# Patient Record
Sex: FEMALE | Race: WHITE | Employment: FULL TIME | URBAN - METROPOLITAN AREA
[De-identification: names, ages, dates, MRNs, and addresses within clinical notes are randomized per-mention and may not be internally consistent; named-entity substitution may affect disease eponyms.]

---

## 2017-07-21 ENCOUNTER — HOSPITAL ENCOUNTER (EMERGENCY)
Age: 32
Discharge: HOME | End: 2017-07-21
Attending: EMERGENCY MEDICINE
Payer: SELF-PAY

## 2017-07-21 VITALS
WEIGHT: 223 LBS | DIASTOLIC BLOOD PRESSURE: 106 MMHG | HEIGHT: 69 IN | HEART RATE: 90 BPM | RESPIRATION RATE: 16 BRPM | BODY MASS INDEX: 33.03 KG/M2 | TEMPERATURE: 98.6 F | OXYGEN SATURATION: 100 % | SYSTOLIC BLOOD PRESSURE: 151 MMHG

## 2017-07-21 DIAGNOSIS — M54.2 NECK PAIN: Primary | ICD-10-CM

## 2017-07-21 PROCEDURE — 99282 EMERGENCY DEPT VISIT SF MDM: CPT | Performed by: NURSE PRACTITIONER

## 2017-07-21 RX ORDER — METHOCARBAMOL 750 MG/1
750 TABLET, FILM COATED ORAL
Qty: 15 TAB | Refills: 0 | Status: SHIPPED | OUTPATIENT
Start: 2017-07-21
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
## 2022-08-20 ENCOUNTER — HOSPITAL ENCOUNTER (EMERGENCY)
Age: 37
Discharge: HOME | End: 2022-08-20
Attending: STUDENT IN AN ORGANIZED HEALTH CARE EDUCATION/TRAINING PROGRAM
Payer: COMMERCIAL

## 2022-08-20 ENCOUNTER — HOSPITAL ENCOUNTER (EMERGENCY)
Dept: ULTRASOUND IMAGING | Age: 37
Discharge: HOME | End: 2022-08-23
Payer: COMMERCIAL

## 2022-08-20 VITALS
DIASTOLIC BLOOD PRESSURE: 73 MMHG | RESPIRATION RATE: 16 BRPM | BODY MASS INDEX: 34.36 KG/M2 | HEART RATE: 95 BPM | SYSTOLIC BLOOD PRESSURE: 121 MMHG | OXYGEN SATURATION: 100 % | WEIGHT: 240 LBS | TEMPERATURE: 98.6 F | HEIGHT: 70 IN

## 2022-08-20 DIAGNOSIS — M79.605 PAIN IN BOTH LOWER EXTREMITIES: ICD-10-CM

## 2022-08-20 DIAGNOSIS — M79.89 LEG SWELLING: Primary | ICD-10-CM

## 2022-08-20 DIAGNOSIS — M79.604 PAIN IN BOTH LOWER EXTREMITIES: ICD-10-CM

## 2022-08-20 PROCEDURE — 99282 EMERGENCY DEPT VISIT SF MDM: CPT

## 2022-08-20 PROCEDURE — 93970 EXTREMITY STUDY: CPT

## 2022-08-20 ASSESSMENT — PAIN - FUNCTIONAL ASSESSMENT: PAIN_FUNCTIONAL_ASSESSMENT: 0-10

## 2022-08-20 ASSESSMENT — PAIN SCALES - GENERAL: PAINLEVEL_OUTOF10: 6

## 2022-08-20 ASSESSMENT — PAIN DESCRIPTION - DESCRIPTORS: DESCRIPTORS: ACHING

## 2022-08-20 ASSESSMENT — PAIN DESCRIPTION - LOCATION: LOCATION: LEG

## 2022-08-20 ASSESSMENT — PAIN DESCRIPTION - FREQUENCY: FREQUENCY: INTERMITTENT

## 2022-08-20 ASSESSMENT — PAIN DESCRIPTION - ORIENTATION: ORIENTATION: LEFT;RIGHT

## 2022-08-20 NOTE — DISCHARGE INSTRUCTIONS
Wear a light weight support stockings while at work, increase water intake, call office to arrange follow up appt, return to er if symptoms worsen    We would love to help you get a primary care doctor for follow-up after your emergency department visit. Please call [phone] between 7AM - 6PM Monday to Friday. A care navigator will be able to assist you with setting up a doctor close to your home.

## 2022-08-20 NOTE — ED PROVIDER NOTES
[facility] Emergency Department Provider Note                   PCP:                No primary care provider on file. Age: 40 y.o. Sex: female       ICD-10-CM    1. Leg swelling  M79.89 Vascular duplex lower extremity venous bilateral     Vascular duplex lower extremity venous bilateral      2. Pain in both lower extremities  M79.604     M79.605           DISPOSITION Decision To Discharge 08/20/2022 05:50:25 PM       New Prescriptions    No medications on file       Orders Placed This Encounter   Procedures    Vascular duplex lower extremity venous bilateral         Jus Chappell is a 40 y.o. female who presents to the Emergency Department with chief complaint of    Chief Complaint   Patient presents with    Leg Swelling      Patient to ER complaining of bilateral leg tightness off and on for the past month. She states she works on her feet 12-hour days and after few hours her legs start to feel tight. She denies any chest pain or shortness of breath. No trauma. No obvious edema to the legs. She has not taken any medication to help her symptoms is any change in her weight. History of diabetes, rheumatoid or any autoimmune disease, patient denies any pertinent family or surgical history        Review of Systems   All other systems reviewed and are negative. All other systems reviewed and are negative. No past medical history on file. No past surgical history on file. No family history on file. Social Connections: Not on file        No Known Allergies     Vitals signs and nursing note reviewed. Patient Vitals for the past 4 hrs:   Temp Pulse Resp BP SpO2   08/20/22 1556 98.6 °F (37 °C) 95 16 134/80 100 %          Physical Exam  Vitals and nursing note reviewed. Constitutional:       Appearance: Normal appearance. She is normal weight. HENT:      Head: Normocephalic and atraumatic.       Right Ear: External ear normal.      Left Ear: External ear normal.      Nose: Nose normal.      Mouth/Throat:      Mouth: Mucous membranes are moist.      Pharynx: Oropharynx is clear. Eyes:      Extraocular Movements: Extraocular movements intact. Pupils: Pupils are equal, round, and reactive to light. Cardiovascular:      Rate and Rhythm: Normal rate and regular rhythm. Pulses: Normal pulses. Heart sounds: Normal heart sounds. Pulmonary:      Effort: Pulmonary effort is normal.      Breath sounds: Normal breath sounds. No rales. Chest:      Chest wall: No tenderness. Abdominal:      Tenderness: There is no abdominal tenderness. Hernia: No hernia is present. Musculoskeletal:         General: Tenderness present. No swelling. Normal range of motion. Cervical back: Normal range of motion and neck supple. Comments: Bilateral legs are symmetrical in size and color. Patient states her legs have a slight mottling appearance that is been true for years. There is no pitting edema. Calves are soft thighs are soft full range of motion of bilateral hips knees and ankles. Good pedal pulses. She states legs are not as tender now as they are after prolonged standing or walking. Skin:     General: Skin is warm and dry. Neurological:      General: No focal deficit present. Mental Status: She is alert. Psychiatric:         Mood and Affect: Mood normal.         Behavior: Behavior normal.        MDM  Number of Diagnoses or Management Options  Leg swelling  Pain in both lower extremities  Diagnosis management comments: Vascular duplex lower extremity venous bilateral   Final Result    No evidence of deep venous thrombosis in the bilateral lower extremities.       Patient has no risk factors for claudication but advised to use light weight compression stockings while at work water intake arrange primary care appointment for recheck return to ER if chest pain or worsening symptoms       Amount and/or Complexity of Data Reviewed  Tests in the radiology section of CPT®: ordered and reviewed  Review and summarize past medical records: yes    Risk of Complications, Morbidity, and/or Mortality  Presenting problems: moderate  Diagnostic procedures: moderate  Management options: low    Patient Progress  Patient progress: improved      Procedures    Labs Reviewed - No data to display     Vascular duplex lower extremity venous bilateral   Final Result   No evidence of deep venous thrombosis in the bilateral lower extremities. [place] Coma Scale  Eye Opening: Spontaneous  Best Verbal Response: Oriented  Best Motor Response: Obeys commands  Uri Coma Scale Score: 15                     Voice dictation software was used during the making of this note. This software is not perfect and grammatical and other typographical errors may be present. This note has not been completely proofread for errors.      TAIWO Real  08/20/22 1500 TAIWO Arellano  08/20/22 9626

## 2022-08-20 NOTE — ED NOTES
I have reviewed discharge instructions with the patient. The patient verbalized understanding. Patient left ED via Discharge Method: ambulatory to Home with SELF. Opportunity for questions and clarification provided. Patient given 0 scripts. To continue your aftercare when you leave the hospital, you may receive an automated call from our care team to check in on how you are doing. This is a free service and part of our promise to provide the best care and service to meet your aftercare needs.  If you have questions, or wish to unsubscribe from this service please call [phone]. Thank you for Choosing our [facility] Emergency Department.         Nadege Devine RN  08/20/22 6377

## 2022-08-20 NOTE — ED TRIAGE NOTES
Reports having swelling and pain to bilateral lower extremities for the past two weeks. States the pain worsens while standing for long periods of time. States the pain is worse in posterior and anterior region of knees and describes it as pressure. Denies recent injury or trauma. Mottling noted around knee region. States these areas turn red when she walks around at work. Also reports having a \"knot on genital\" that is painful that she noticed today. Denies any drainage, fever, chills, or body aches.
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
